# Patient Record
Sex: FEMALE | Race: AMERICAN INDIAN OR ALASKA NATIVE
[De-identification: names, ages, dates, MRNs, and addresses within clinical notes are randomized per-mention and may not be internally consistent; named-entity substitution may affect disease eponyms.]

---

## 2021-12-20 ENCOUNTER — HOSPITAL ENCOUNTER (EMERGENCY)
Dept: HOSPITAL 5 - ED | Age: 36
Discharge: HOME | End: 2021-12-20
Payer: SELF-PAY

## 2021-12-20 VITALS — SYSTOLIC BLOOD PRESSURE: 120 MMHG | DIASTOLIC BLOOD PRESSURE: 66 MMHG

## 2021-12-20 DIAGNOSIS — Z3A.01: ICD-10-CM

## 2021-12-20 DIAGNOSIS — O20.9: Primary | ICD-10-CM

## 2021-12-20 LAB
ALBUMIN SERPL-MCNC: 4 G/DL (ref 3.9–5)
ALT SERPL-CCNC: 15 UNITS/L (ref 7–56)
BASOPHILS # (AUTO): 0 K/MM3 (ref 0–0.1)
BASOPHILS NFR BLD AUTO: 0.4 % (ref 0–1.8)
BILIRUB UR QL STRIP: (no result)
BLOOD UR QL VISUAL: (no result)
BUN SERPL-MCNC: 6 MG/DL (ref 7–17)
BUN/CREAT SERPL: 15 %
CALCIUM SERPL-MCNC: 9.9 MG/DL (ref 8.4–10.2)
CAOX CRY #/AREA URNS HPF: (no result) /[HPF]
EOSINOPHIL # BLD AUTO: 0.4 K/MM3 (ref 0–0.4)
EOSINOPHIL NFR BLD AUTO: 5.8 % (ref 0–4.3)
HCT VFR BLD CALC: 38.6 % (ref 30.3–42.9)
HEMOLYSIS INDEX: 4
HGB BLD-MCNC: 12 GM/DL (ref 10.1–14.3)
LYMPHOCYTES # BLD AUTO: 2.4 K/MM3 (ref 1.2–5.4)
LYMPHOCYTES NFR BLD AUTO: 35.3 % (ref 13.4–35)
MCHC RBC AUTO-ENTMCNC: 31 % (ref 30–34)
MCV RBC AUTO: 82 FL (ref 79–97)
MONOCYTES # (AUTO): 0.6 K/MM3 (ref 0–0.8)
MONOCYTES % (AUTO): 8.5 % (ref 0–7.3)
MUCOUS THREADS #/AREA URNS HPF: (no result) /HPF
PH UR STRIP: 7 [PH] (ref 5–7)
PLATELET # BLD: 385 K/MM3 (ref 140–440)
PROT UR STRIP-MCNC: (no result) MG/DL
RBC # BLD AUTO: 4.68 M/MM3 (ref 3.65–5.03)
RBC #/AREA URNS HPF: > 182 /HPF (ref 0–6)
UROBILINOGEN UR-MCNC: < 2 MG/DL (ref ?–2)
WBC #/AREA URNS HPF: 19 /HPF (ref 0–6)

## 2021-12-20 PROCEDURE — 86900 BLOOD TYPING SEROLOGIC ABO: CPT

## 2021-12-20 PROCEDURE — 86901 BLOOD TYPING SEROLOGIC RH(D): CPT

## 2021-12-20 PROCEDURE — 84702 CHORIONIC GONADOTROPIN TEST: CPT

## 2021-12-20 PROCEDURE — 81001 URINALYSIS AUTO W/SCOPE: CPT

## 2021-12-20 PROCEDURE — 99284 EMERGENCY DEPT VISIT MOD MDM: CPT

## 2021-12-20 PROCEDURE — 36415 COLL VENOUS BLD VENIPUNCTURE: CPT

## 2021-12-20 PROCEDURE — 80053 COMPREHEN METABOLIC PANEL: CPT

## 2021-12-20 PROCEDURE — 87086 URINE CULTURE/COLONY COUNT: CPT

## 2021-12-20 PROCEDURE — 85025 COMPLETE CBC W/AUTO DIFF WBC: CPT

## 2021-12-20 PROCEDURE — 76801 OB US < 14 WKS SINGLE FETUS: CPT

## 2021-12-20 NOTE — EMERGENCY DEPARTMENT REPORT
ED Female  HPI





- General


Chief complaint: Vaginal Bleeding


Stated complaint: 8 WK PG, VAG BLEEDING


Source: patient


Mode of arrival: Ambulatory


Limitations: No Limitations





- History of Present Illness


Initial comments: 





She is a 36-year-old female presents emergency department with complaints of 

positive pregnancy test and vaginal bleeding.  Patient reports that she took a 

home pregnancy test on Thursday that was positive.  Her last menstrual period 

was October 1.  She also notes that she has had bleeding started today and she 

notes that she is changing pads every 2-3 hours although these are not 

completely soaked.  She notes that she is not currently on any birth control.





- Related Data


                                    Allergies











Allergy/AdvReac Type Severity Reaction Status Date / Time


 


No Known Allergies Allergy   Unverified 12/20/21 18:51














ED Review of Systems


ROS: 


Stated complaint: 8 WK PG, VAG BLEEDING


Other details as noted in HPI





Constitutional: denies: chills, fever


Eyes: denies: eye pain, eye discharge, vision change


ENT: denies: ear pain, throat pain


Respiratory: denies: cough, shortness of breath, wheezing


Cardiovascular: denies: chest pain, palpitations


Endocrine: no symptoms reported


Gastrointestinal: abdominal pain.  denies: nausea, vomiting


Genitourinary: denies: urgency, dysuria, hematuria, discharge


Musculoskeletal: denies: back pain, joint swelling, arthralgia


Skin: denies: rash, lesions


Neurological: denies: headache, weakness, paresthesias


Psychiatric: denies: anxiety, depression


Hematological/Lymphatic: denies: easy bleeding, easy bruising





ED Physical Exam





- General


Limitations: No Limitations


General appearance: alert, in no apparent distress





- Head


Head exam: Present: atraumatic, normocephalic





- Eye


Eye exam: Present: normal appearance





- ENT


ENT exam: Present: mucous membranes moist





- Neck


Neck exam: Present: normal inspection





- Respiratory


Respiratory exam: Present: normal lung sounds bilaterally.  Absent: respiratory 

distress





- Cardiovascular


Cardiovascular Exam: Present: regular rate, normal rhythm.  Absent: systolic 

murmur, diastolic murmur, rubs, gallop





- GI/Abdominal


GI/Abdominal exam: Present: soft, normal bowel sounds.  Absent: tenderness





- Rectal


Rectal exam: Present: deferred





- Extremities Exam


Extremities exam: Present: normal inspection





- Back Exam


Back exam: Present: normal inspection





- Neurological Exam


Neurological exam: Present: alert, oriented X3





- Psychiatric


Psychiatric exam: Present: normal affect, normal mood





- Skin


Skin exam: Present: warm, dry, intact, normal color.  Absent: rash





ED Course


                                   Vital Signs











  12/20/21 12/20/21





  18:51 22:23


 


Temperature 98.4 F 


 


Pulse Rate 64 64


 


Respiratory 17 16





Rate  


 


Blood Pressure 161/96 120/66





[Right]  


 


O2 Sat by Pulse 100 98





Oximetry  














- Reevaluation(s)


Reevaluation #1: 





12/20/21 22:43


Patient's labs and ultrasound images were reviewed with OB on-call, Dr. Moreno.

  He does recommend that patient return in 48 hours for repeat beta hCG testing.

  If is trending down and patient has no worsening symptoms that should be okay 

for no ultrasound to be repeated however if patient has increase in beta hcg, 

worsening symptoms or increased bleeding her ultrasound should be repeated.











ED Medical Decision Making





- Lab Data


Result diagrams: 


                                 12/20/21 19:44





                                 12/20/21 19:44





- Medical Decision Making





Patient is here with complaint of positive pregnancy test and vaginal bleeding. 

 Her labs here do demonstrate a positive quant level of 139.  Given this plan 

for ultrasound to evaluate.  Differential includes ectopic pregnancy, early 

pregnancy, miscarriage.  Will discuss and reevaluate with patient after Rh 

results and ultrasound.


Critical care attestation.: 


If time is entered above; I have spent that time in minutes in the direct care 

of this critically ill patient, excluding procedure time.








ED Disposition


Clinical Impression: 


 Encounter for assessment for suspected ectopic pregnancy, Pregnancy of unknown 

anatomic location





Disposition: 01 HOME / SELF CARE / HOMELESS


Is pt being admited?: No


Does the pt Need Aspirin: No


Condition: Stable


Instructions:  Threatened Miscarriage, Easy-to-Read, Ectopic Pregnancy, 

Easy-to-Read


Additional Instructions: 


At this time your pregnancy hormone is low and your ultrasound does not show a 

pregnancy.  There is concerned that you could have an ectopic pregnancy or that 

you could have had a miscarriage.  You should return in 48 hours to the 

emergency department for repeat blood hormone testing.  If your hormone level is

 going low or your symptoms are improving then we will not need to repeat an 

ultrasound.


Referrals: 


PRIMARY CARE,MD [Primary Care Provider] - 3-5 Days


CROW MORENO MD [Staff Physician] - 7-10 days


Time of Disposition: 22:48

## 2021-12-20 NOTE — ULTRASOUND REPORT
ULTRASOUND OBSTETRIC 



INDICATION / CLINICAL INFORMATION: pos preg test; vaginal bleeding; abdominal cramping. Beta hCG 139.
5

Clinical Gestational Age (GA) in weeks, days: 11 weeks 3 days 



TECHNIQUE: Transabdominal.



COMPARISON: None available.



FINDINGS:

GESTATIONAL SAC: No gestational sac identified. The endometrial echo complex measures 0.6 cm. There i
s a complex hypoechoic area seen in the lower uterine segment measuring 3.6 x 1.7 cm



ADNEXA: Right ovary is normal measuring 2.6 x 1.8 x 1.6 cm.Left ovary appears normal and measures 2.7
 x 1.8 x 2.1 cm.

FREE FLUID: No free fluid identified



ADDITIONAL FINDINGS: None.



IMPRESSION:

1. No intrauterine gestational sac or fetal pole identified. There is a hypoechoic collection in the 
lower uterine segment which is nonspecific. These findings are consistent with pregnancy of unknown l
ocation. Recommend short-term follow-up and OB/GYN consultation.

2. The ovaries appear normal.



Signer Name: Joby Velasquez MD 

Signed: 12/20/2021 9:40 PM

Workstation Name: [id number]

## 2021-12-20 NOTE — EVENT NOTE
ED Screening Note


ED Screening Note: 





Patient presents for vaginal bleeding that began 12/11/21


She states her last normal menstrual cycle was October 1


Reports that she took a pregnancy test over-the-counter and states it was 

positive but she has not had this pregnancy confirmed


she states that sometimes she does have irregular cycles and will miss a cycle 

for a month


She states that she is changing her pad approximately every 3 hour


She has some mild lower abdominal cramping








This initial assessment/diagnostic orders/clinical plan/treatment(s) is/are 

subject to change based on patients health status, clinical progression and re-

assessment by fellow clinical providers in the ED. Further treatment and workup 

at subsequent clinical providers discretion. Patient/guardian urged not to elope

from the ED as their condition may be serious if not clinically assessed and 

managed. 





Initial orders include: 


Labs, urine





Awaiting hCG quant to see if patient needs OB ultrasound